# Patient Record
Sex: FEMALE | Race: WHITE | NOT HISPANIC OR LATINO | Employment: FULL TIME | URBAN - NONMETROPOLITAN AREA
[De-identification: names, ages, dates, MRNs, and addresses within clinical notes are randomized per-mention and may not be internally consistent; named-entity substitution may affect disease eponyms.]

---

## 2022-04-22 ENCOUNTER — TRANSCRIBE ORDERS (OUTPATIENT)
Dept: GENERAL RADIOLOGY | Facility: HOSPITAL | Age: 62
End: 2022-04-22

## 2022-04-22 ENCOUNTER — HOSPITAL ENCOUNTER (OUTPATIENT)
Dept: GENERAL RADIOLOGY | Facility: HOSPITAL | Age: 62
Discharge: HOME | End: 2022-04-22
Admitting: FAMILY MEDICINE

## 2022-04-22 DIAGNOSIS — M25.532 BILATERAL WRIST PAIN: ICD-10-CM

## 2022-04-22 DIAGNOSIS — M25.532 BILATERAL WRIST PAIN: Primary | ICD-10-CM

## 2022-04-22 DIAGNOSIS — M25.531 BILATERAL WRIST PAIN: Primary | ICD-10-CM

## 2022-04-22 DIAGNOSIS — M25.531 BILATERAL WRIST PAIN: ICD-10-CM

## 2022-04-22 DIAGNOSIS — Z12.31 SCREENING MAMMOGRAM FOR BREAST CANCER: ICD-10-CM

## 2022-04-22 PROCEDURE — 73110 X-RAY EXAM OF WRIST: CPT

## 2022-08-16 ENCOUNTER — OFFICE VISIT (OUTPATIENT)
Dept: NEUROSURGERY | Facility: CLINIC | Age: 62
End: 2022-08-16

## 2022-08-16 ENCOUNTER — TELEPHONE (OUTPATIENT)
Dept: NEUROSURGERY | Facility: CLINIC | Age: 62
End: 2022-08-16

## 2022-08-16 VITALS
SYSTOLIC BLOOD PRESSURE: 120 MMHG | HEIGHT: 66 IN | TEMPERATURE: 97.8 F | DIASTOLIC BLOOD PRESSURE: 70 MMHG | WEIGHT: 164.2 LBS | BODY MASS INDEX: 26.39 KG/M2

## 2022-08-16 DIAGNOSIS — G89.29 OTHER CHRONIC BACK PAIN: Primary | ICD-10-CM

## 2022-08-16 DIAGNOSIS — M54.50 CHRONIC BILATERAL LOW BACK PAIN WITHOUT SCIATICA: Primary | ICD-10-CM

## 2022-08-16 DIAGNOSIS — M54.9 OTHER CHRONIC BACK PAIN: Primary | ICD-10-CM

## 2022-08-16 DIAGNOSIS — Z98.890 HISTORY OF LUMBAR DISCECTOMY: ICD-10-CM

## 2022-08-16 DIAGNOSIS — G89.29 CHRONIC BILATERAL LOW BACK PAIN WITHOUT SCIATICA: Primary | ICD-10-CM

## 2022-08-16 PROCEDURE — 99204 OFFICE O/P NEW MOD 45 MIN: CPT

## 2022-08-16 NOTE — PROGRESS NOTES
Subjective   Patient: Chayo Greenfield   Age, Date of Birth: 62 y.o., 1960  Sex: female    Primary Care Provider: Caryl Lawson MD    Chief Complaint: Chronic low back pain    History of Present Illness:  Patient is a 62-year-old female with a past surgical history of an L3-L4 and L4-L5 discectomy performed in 2021 in [place].  No instrumentation was documented in the operative report.  Shortly thereafter her surgery, her neurosurgeon was diagnosed with pancreatic cancer, and  shortly thereafter.  Patient never had any follow-up from her procedure, and denies having any follow-up films performed.  Prior to the surgery patient was experiencing low back pain, that have been managed by injections at L3-L4 for a year and a half until that was no longer an efficacious therapy.  Patient also reports having injections at L5-S1, but reports that those were not as efficacious as L3-L4.  Prior to the surgery patient had been experiencing numbness and pain in the left leg, but no weakness.  Patient denies any right-sided symptoms.  She also was having difficulty walking prior to the surgery.  Patient reports that after the surgery her leg symptoms have almost entirely resolved, she does have intermittent shooting pain in her left hip.      Her main concern today is continuing back pain and the pain in her left hip that she has not experienced since before the surgery.  Patient reports that the back pain did not go away after her surgery, but it certainly has worsened since the surgery.  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  Patient reports lots of twisting, bending with weight.  That being said her pain is mostly in the lower back which has been managed by [facility] in [place] with injections, though the efficacy of the injections is not as  efficacious as the patient would like. Patient denies having gone to physical therapy since her procedure.  Patient denies any urinary or bowel incontinence.      Current Outpatient Medications   Medication Sig Dispense Refill   • HYDROCODONE-ACETAMINOPHEN PO Take 10 mg by mouth. 2-3 times as needed       No current facility-administered medications for this visit.       No Known Allergies      Past Medical History:   Diagnosis Date   • Arthritis    • Back ache        Social History     Socioeconomic History   • Marital status:    Tobacco Use   • Smokeless tobacco: Never Used   Substance and Sexual Activity   • Drug use: Never       Family History   Problem Relation Age of Onset   • Hypertension Sister        Review of Systems   Constitutional: Negative for activity change, appetite change, chills, diaphoresis, fatigue, fever and unexpected weight change.   HENT: Negative for congestion, dental problem, drooling, ear discharge, ear pain, facial swelling, hearing loss, mouth sores, nosebleeds, postnasal drip, rhinorrhea, sinus pressure, sinus pain, sneezing, sore throat, tinnitus, trouble swallowing and voice change.    Eyes: Negative for photophobia, pain, discharge, redness, itching and visual disturbance.   Respiratory: Negative for apnea, cough, choking, chest tightness, shortness of breath, wheezing and stridor.    Cardiovascular: Negative for chest pain, palpitations and leg swelling.   Gastrointestinal: Negative for abdominal distention, abdominal pain, anal bleeding, blood in stool, constipation, diarrhea, nausea, rectal pain and vomiting.   Endocrine: Negative for cold intolerance, heat intolerance, polydipsia, polyphagia and polyuria.   Genitourinary: Negative for decreased urine volume, difficulty urinating, dysuria, enuresis, flank pain, frequency, genital sores, hematuria and urgency.   Musculoskeletal: Positive for back pain. Negative for arthralgias, gait problem, joint swelling, myalgias, neck  "pain and neck stiffness.   Skin: Negative for color change, pallor, rash and wound.   Allergic/Immunologic: Negative for environmental allergies, food allergies and immunocompromised state.   Neurological: Positive for numbness. Negative for dizziness, tremors, seizures, syncope, facial asymmetry, speech difficulty, weakness, light-headedness and headaches.   Hematological: Negative for adenopathy. Does not bruise/bleed easily.   Psychiatric/Behavioral: Negative for agitation, behavioral problems, confusion, decreased concentration, dysphoric mood, hallucinations, self-injury, sleep disturbance and suicidal ideas. The patient is not nervous/anxious and is not hyperactive.        Objective   Vitals:    08/16/22 1456   BP: 120/70   BP Location: Right arm   Patient Position: Sitting   Cuff Size: Adult   Temp: 97.8 °F (36.6 °C)   Weight: 74.5 kg (164 lb 3.2 oz)   Height: 167.6 cm (66\")        Physical Exam:    Physical Exam  Constitutional:       Appearance: Normal appearance.   HENT:      Right Ear: External ear normal.      Left Ear: External ear normal.   Eyes:      General: No scleral icterus.        Right eye: No discharge.         Left eye: No discharge.   Pulmonary:      Effort: Pulmonary effort is normal. No respiratory distress.   Musculoskeletal:      Comments: Patient had 5 out of 5 strength with hip flexion, dorsal and plantar flexion bilaterally.   Neurological:      Mental Status: She is alert.      Cranial Nerves: No cranial nerve deficit or facial asymmetry.      Sensory: Sensation is intact. No sensory deficit.      Motor: Motor function is intact. No weakness, tremor, atrophy or abnormal muscle tone.      Coordination: Romberg sign negative.      Gait: Gait and tandem walk normal.      Deep Tendon Reflexes:      Reflex Scores:       Patellar reflexes are 2+ on the right side and 2+ on the left side.       Achilles reflexes are 2+ on the right side and 2+ on the left side.     Comments: Straight leg " raise on left side reproduced a pain from the left outer hip into the lower back, consistent with the pain she was experiencing prior to her surgery.  Patient had intact vibratory and temperature sensation in lower extremities bilaterally.  Patient had no signs of ankle clonus bilaterally.  Patient was able to walk on tiptoes as well as heels free of pain or difficulty.   Psychiatric:         Mood and Affect: Mood normal.         Thought Content: Thought content normal.         Judgment: Judgment normal.         Chayo Greenfield  does not have a smoking history on file. She has never used smokeless tobacco.. I     BMI is >= 25 and <30. (Overweight) The following options were offered after discussion;: exercise counseling/recommendations     STEADI Fall Risk Assessment has not been completed.    Assessment & Plan     Data Review:  (All imaging is independently reviewed unless stated otherwise.)  Patient underwent an MRI of the lumbar spine without contrast on 7/10/2021.  In general disc height is good, and disc do not appear to be desiccated.    Canal is mostly patent with minor disc herniations at L2-L3, L4-L5, and L5-S1.  None of these appear to impinge on the spinal cord.   Patient has minor to moderate left neuroforaminal narrowing at L3-L4.   Patient has minor to moderate neuroforaminal narrowing at L4-L5.  Patient has moderate to severe left-sided neuroforaminal narrowing at L5-S1.    Medical Decision Making:  Given that the patient has had no proper follow-up from her procedure, patient will undergo an x-ray of the lumbar spine with flexion and extension to assess how well she has healed from her prior surgery.  Given her new symptoms since the surgery as well, new MRI of the lumbar spine will be taken to assess her nerve roots.  Patient has not undergone an MRI since her before her and procedure.  She was educated that her back pain may be chronic in nature, and she was given a PT referral to help her better  manage her low back pain.  Patient will follow up with Jason Peralta PA-C in 6 weeks to assess how PT has gone as well as go over her films with her.  If there is nothing surgical found on MRI, patient will likely have to continue her low back pain with pain management like she has been prior.  Patient encouraged to contact us if they have any changes in their condition or any concerns.     Diagnosis Plan   1. Chronic bilateral low back pain without sciatica  Ambulatory Referral to Physical Therapy    MRI Lumbar Spine Without Contrast    XR Spine Lumbar Flex & Ext   2. History of lumbar discectomy  Ambulatory Referral to Physical Therapy    MRI Lumbar Spine Without Contrast    XR Spine Lumbar Flex & Ext    From L3 to L5 performed in November 2021 in [place]       Electronically signed by:    Jason Peralta PA-C on 13:30 EDT 08/17/22

## 2022-09-22 ENCOUNTER — HOSPITAL ENCOUNTER (OUTPATIENT)
Dept: MRI IMAGING | Facility: HOSPITAL | Age: 62
Discharge: HOME | End: 2022-09-22

## 2022-09-22 DIAGNOSIS — G89.29 CHRONIC BILATERAL LOW BACK PAIN WITHOUT SCIATICA: ICD-10-CM

## 2022-09-22 DIAGNOSIS — Z98.890 HISTORY OF LUMBAR DISCECTOMY: ICD-10-CM

## 2022-09-22 DIAGNOSIS — M54.50 CHRONIC BILATERAL LOW BACK PAIN WITHOUT SCIATICA: ICD-10-CM

## 2022-09-22 PROCEDURE — 72148 MRI LUMBAR SPINE W/O DYE: CPT

## 2022-09-28 ENCOUNTER — OFFICE VISIT (OUTPATIENT)
Dept: NEUROSURGERY | Facility: CLINIC | Age: 62
End: 2022-09-28

## 2022-09-28 VITALS
HEIGHT: 66 IN | TEMPERATURE: 96.4 F | SYSTOLIC BLOOD PRESSURE: 118 MMHG | WEIGHT: 163.8 LBS | BODY MASS INDEX: 26.33 KG/M2 | DIASTOLIC BLOOD PRESSURE: 68 MMHG

## 2022-09-28 DIAGNOSIS — Z98.890 HISTORY OF LUMBAR DISCECTOMY: Primary | ICD-10-CM

## 2022-09-28 DIAGNOSIS — G89.29 CHRONIC BILATERAL LOW BACK PAIN WITH LEFT-SIDED SCIATICA: ICD-10-CM

## 2022-09-28 DIAGNOSIS — M54.42 CHRONIC BILATERAL LOW BACK PAIN WITH LEFT-SIDED SCIATICA: ICD-10-CM

## 2022-09-28 DIAGNOSIS — M54.9 OTHER CHRONIC BACK PAIN: ICD-10-CM

## 2022-09-28 DIAGNOSIS — G89.29 OTHER CHRONIC BACK PAIN: ICD-10-CM

## 2022-09-28 PROCEDURE — 99214 OFFICE O/P EST MOD 30 MIN: CPT

## 2022-09-28 RX ORDER — IBUPROFEN 200 MG
600 TABLET ORAL EVERY 6 HOURS PRN
COMMUNITY

## 2022-09-28 NOTE — PROGRESS NOTES
Subjective   Patient: Chayo Greenfield   Age, Date of Birth: 62 y.o., 1960  Sex: female    Primary Care Provider: Caryl Lawson MD    Chief Complaint: Low back pain that radiates into the left leg    History of Present Illness:  Patient is a 62-year-old female presenting for follow-up from her appointment on 2022 with myself, at that appointment she was espousing continuing low back pain from a L3-L4 and L4-L5 discectomy performed in 2021 in [place], her neurosurgeon  shortly thereafter and she was lost to follow-up.  I gave her a PT referral at that appointment, however she was unable to go to PT.  She is also currently in pain management, they recommended another intervention to her that she is unfamiliar with.  Regards to her back pain her symptoms are about the same as they were last time, at this appointment I was better able to elicit that her back pain was improving after surgery, and her relief kind of plateaued.  Her back pain waxes and wanes, with some days it is a 7 or 8 out of 10, and other days she does not hurt until around 11.  The pain starts in her back, and periodically will radiate into her left leg to the top of the foot.  Her pain is about 90% in her back.  She reports bending over makes her leg pain worse, and that if she works hard 1 day she will have worse back pain the next day.  She denies urinary or bowel incontinence.    Current Outpatient Medications   Medication Sig Dispense Refill   • HYDROCODONE-ACETAMINOPHEN PO Take 10 mg by mouth. 2-3 times as needed     • ibuprofen (ADVIL,MOTRIN) 200 MG tablet Take 600 mg by mouth Every 6 (Six) Hours As Needed for Mild Pain.       No current facility-administered medications for this visit.       No Known Allergies      Past Medical History:   Diagnosis Date   • Arthritis    • Back ache        Social History     Socioeconomic History   • Marital status:    Tobacco Use   • Smokeless tobacco: Never Used   Substance and  "Sexual Activity   • Drug use: Never       Family History   Problem Relation Age of Onset   • Hypertension Sister        Review of Systems   Musculoskeletal: Positive for arthralgias and back pain.   Neurological: Positive for numbness.       Objective   Vitals:    09/28/22 1411   BP: 118/68   Temp: 96.4 °F (35.8 °C)   TempSrc: Infrared   Weight: 74.3 kg (163 lb 12.8 oz)   Height: 167.6 cm (66\")        Physical Exam:    Physical Exam  Vitals and nursing note reviewed.   Constitutional:       Appearance: Normal appearance.   HENT:      Head: Normocephalic and atraumatic.      Right Ear: External ear normal.      Left Ear: External ear normal.   Eyes:      General: No scleral icterus.        Right eye: No discharge.         Left eye: No discharge.   Pulmonary:      Effort: Pulmonary effort is normal. No respiratory distress.   Musculoskeletal:      Right lower leg: No edema.      Left lower leg: No edema.      Comments: Patient had 5 out of 5 strength with hip flexion, plantar and dorsiflexion bilaterally.   Neurological:      Mental Status: She is alert.      Cranial Nerves: No cranial nerve deficit or facial asymmetry.      Sensory: Sensation is intact. No sensory deficit.      Motor: Motor function is intact. No weakness, tremor, atrophy, abnormal muscle tone or seizure activity.      Coordination: Romberg sign negative.      Gait: Gait is intact. Gait and tandem walk normal.      Deep Tendon Reflexes:      Reflex Scores:       Patellar reflexes are 2+ on the right side and 2+ on the left side.       Achilles reflexes are 2+ on the right side and 2+ on the left side.     Comments: Straight leg raise on left side reproduced a pain from the left outer hip into the lower back, consistent with the pain she was experiencing prior to her surgery.  Patient had intact vibratory and temperature sensation in lower extremities bilaterally.  Patient had no signs of ankle clonus bilaterally.  Patient was able to walk on tiptoes " as well as heels free of pain or difficulty   Psychiatric:         Mood and Affect: Mood normal.         Behavior: Behavior normal.         Thought Content: Thought content normal.         Judgment: Judgment normal.         Tobacco Use: Unknown   • Smoking Tobacco Use: Never Assessed   • Smokeless Tobacco Use: Never Used       BMI is >= 25 and <30. (Overweight) The following options were offered after discussion;: exercise counseling/recommendations       STEADI Fall Risk Assessment has not been completed.    Assessment & Plan     Data Review:  (All imaging is independently reviewed unless stated otherwise.)  MRI of the lumbar spine taken on 9/22/2022 shows evidence of the L3 L5 laminotomy.  Minor disc herniation at L2-L3, and a slightly larger disc herniation at L4-L5.  He left lateral disc herniation is noted at L3-L4 as well.  The lateral disc herniation results in moderate left neuroforaminal narrowing.      Medical Decision Making:  Case was discussed with Jarvis Glass PA-C, patient could potentially have a postlaminectomy syndrome for which she was given a PT referral.  I went to the  with her to ensure that this referral was made properly, as for potential logistical reasons it was not made at her last appointment.  I will follow-up with her in 6 to 8 weeks to reassess this.  I need to discuss his case with Dr. Rober Jang to assess whether or not she could be a surgical candidate.  Patient encouraged to contact us if they have any changes in their condition or any concerns.     Diagnosis Plan   1. History of lumbar discectomy     2. Other chronic back pain     3. Chronic bilateral low back pain with left-sided sciatica         Electronically signed by:    Jason Peralta PA-C on 15:10 EDT 10/06/22

## 2022-10-19 ENCOUNTER — TREATMENT (OUTPATIENT)
Dept: PHYSICAL THERAPY | Facility: CLINIC | Age: 62
End: 2022-10-19

## 2022-10-19 DIAGNOSIS — Z98.890 HISTORY OF LUMBAR DISCECTOMY: ICD-10-CM

## 2022-10-19 DIAGNOSIS — M54.50 CHRONIC BILATERAL LOW BACK PAIN WITHOUT SCIATICA: Primary | ICD-10-CM

## 2022-10-19 DIAGNOSIS — G89.29 CHRONIC BILATERAL LOW BACK PAIN WITHOUT SCIATICA: Primary | ICD-10-CM

## 2022-10-19 PROCEDURE — 97140 MANUAL THERAPY 1/> REGIONS: CPT | Performed by: PHYSICAL THERAPIST

## 2022-10-19 PROCEDURE — 97530 THERAPEUTIC ACTIVITIES: CPT | Performed by: PHYSICAL THERAPIST

## 2022-10-19 PROCEDURE — 97110 THERAPEUTIC EXERCISES: CPT | Performed by: PHYSICAL THERAPIST

## 2022-10-19 PROCEDURE — 97161 PT EVAL LOW COMPLEX 20 MIN: CPT | Performed by: PHYSICAL THERAPIST

## 2022-10-19 NOTE — PROGRESS NOTES
Physical Therapy Initial Evaluation and Plan of Care      Patient: Chayo Greenfeild   : 1960  Diagnosis/ICD-10 Code:  Chronic bilateral low back pain without sciatica [M54.50, G89.29]  Referring practitioner: Jason Peralta PA-C    Subjective Evaluation    History of Present Illness  Mechanism of injury: Patient reports that she has been having low back pain for around 3 years. She had back surgery in 2021. She had a micro discectomy L3/L4, L4/L5. She states that her pain got a little better following surgery but not a lot. Her surgery was performed in [place]. Her surgeon passed away shortly after her surgery. She has transitioned to a new specialist here. She is currently in pain management for her symptoms and is considering a spinal stimulator. Most of her low back pain is on the L side. She has pain and numbness in her L LE that comes and goes. She states that prolonged positioning is a problem. She states that bending over makes her leg symptoms worse. She states that laying down will help her pain for awhile but she cannot stay there too long without pain starting again.     She follows up with neurosurgeon sometime in November.       Patient Occupation:  in [place] (she works virtually)  Pain  Current pain rating: 3 (with medication )  At best pain rating: 3  At worst pain ratin  Quality: sharp and burning  Relieving factors: rest, change in position, ice and medications (Percocet every 4 hours )  Aggravating factors: ambulation, squatting, lifting, stairs, prolonged positioning, movement, standing, sleeping and repetitive movement  Progression: no change    Social Support  Lives in: one-story house  Lives with: spouse    Treatments  Previous treatment: physical therapy, medication and injection treatment  Current treatment: medication  Patient Goals  Patient goals for therapy: decreased pain, increased motion, increased strength, independence with ADLs/IADLs and return to  sport/leisure activities             Objective        Special Questions      Additional Special Questions  Patient denies red flags.       Postural Observations  Seated posture: fair  Standing posture: good    Additional Postural Observation Details  Patient sits with slightly flexed posture.     Palpation   Left   Hypertonic in the erector spinae and lumbar paraspinals.     Right   Hypertonic in the erector spinae and lumbar paraspinals.     Additional Palpation Details  No tenderness is noted in lumbar musculature.     Neurological Testing     Sensation     Lumbar   Left   Intact: light touch    Right   Intact: light touch    Reflexes   Left   Patellar (L4): normal (2+)  Achilles (S1): normal (2+)    Right   Patellar (L4): normal (2+)  Achilles (S1): normal (2+)    Active Range of Motion     Lumbar   Flexion: 50 degrees   Extension: 8 degrees     Strength/Myotome Testing     Left Hip   Planes of Motion   Flexion: 4-  Abduction: 4-  Adduction: 4-    Right Hip   Planes of Motion   Flexion: 4-  Abduction: 4-  Adduction: 4-    Left Knee   Flexion: 4  Extension: 4    Right Knee   Flexion: 4  Extension: 4    Left Ankle/Foot   Dorsiflexion: 5  Plantar flexion: 5    Right Ankle/Foot   Dorsiflexion: 5  Plantar flexion: 5    Tests       Thoracic   Positive slump.     Lumbar     Left   Negative crossed SLR and passive SLR.     Right   Negative crossed SLR and passive SLR.     Left Knee   Negative peroneal nerve tension.     Right Knee   Negative peroneal nerve tension.      General Comments     Lumbar Comments  Patient ambulates with guarded gait pattern.     She demonstrates no difficulty rising to stand.     HEP established. Patient with no pain or discomfort in low back or R LE with exercises.     She demonstrates difficulty maintaining core contraction with pelvic tilt exercise.     Diagnosis/prognosis reviewed.          Assessment & Plan     Assessment  Impairments: abnormal or restricted ROM, activity intolerance,  impaired physical strength, lacks appropriate home exercise program, pain with function and weight-bearing intolerance  Functional Limitations: carrying objects, lifting, walking, pulling, pushing, uncomfortable because of pain, sitting, standing, stooping and unable to perform repetitive tasks  Assessment details: Patient is a 62 year old female who comes to physical therapy with complaints of low back pain. Signs and symptoms are consistent with chronic low back pain. No neuro signs or symptoms were noted during exam, however, patient does describe symptoms into L LE at times. She demonstrates a postural deficit that is likely contributing to pain. She has hx of lumbar discectomy. The patient currently has pain, decreased ROM, decreased strength (core and postural muscles), and inability to perform many essential functional activities. Pt given HEP to assist with noted deficits. She had no reports of pain in low back or in L LE with exercises. Pt will benefit from skilled PT services to address the above issues.       Goals  Plan Goals: SHORT TERM GOALS:     2 weeks  1. Pt independent with HEP  2. Pt to demonstrate trunk AROM 50-75% of expected norms to allow for improved ability to perform ADL's  3. Pt to demonstrate bilateral hip strength 4/5 in all planes to improved stability of the core/trunk     LONG TERM GOALS:   6 weeks  1. Pt to demonstrate trunk AROM % of expected norms to allow for improved ability to perform functional activities  2. Pt to demonstrate ability to perform full functional squat with good form and without increased pain in the low back   3. Pt to report being able to work full shift or work in the home without increase in pain in the back          Plan  Therapy options: will be seen for skilled therapy services  Planned modality interventions: cryotherapy, dry needling, electrical stimulation/Burmese stimulation, ultrasound and thermotherapy (hydrocollator packs)  Planned therapy  interventions: abdominal trunk stabilization, body mechanics training, fine motor coordination training, flexibility, functional ROM exercises, home exercise program, joint mobilization, therapeutic activities, stretching, strengthening, spinal/joint mobilization, soft tissue mobilization, neuromuscular re-education and manual therapy  Frequency: 2x week  Duration in weeks: 6  Treatment plan discussed with: patient        Manual Therapy:    13     mins  45847;  Therapeutic Exercise:    10     mins  44008;     Neuromuscular Coco:        mins  93852;    Therapeutic Activity:     15     mins  37917;     Gait Training:           mins  00601;     Ultrasound:          mins  18938;    Electrical Stimulation:         mins  60619 ( );  Dry Needling          mins self-pay    Timed Treatment:   38   mins   Total Treatment:     55   mins    PT SIGNATURE: Suzanna Browning PT   [place] License: [id number]  DATE TREATMENT INITIATED: 10/19/2022    Initial Certification  Certification Period: 1/16/2023  I certify that the therapy services are furnished while this patient is under my care.  The services outlined above are required by this patient, and will be reviewed every 90 days.     PHYSICIAN: Jason Peralta PA-C      DATE:     Please sign and return via fax to [phone].. Thank you, [facility].

## 2022-10-26 ENCOUNTER — TREATMENT (OUTPATIENT)
Dept: PHYSICAL THERAPY | Facility: CLINIC | Age: 62
End: 2022-10-26

## 2022-10-26 DIAGNOSIS — Z98.890 HISTORY OF LUMBAR DISCECTOMY: ICD-10-CM

## 2022-10-26 DIAGNOSIS — M54.50 CHRONIC BILATERAL LOW BACK PAIN WITHOUT SCIATICA: Primary | ICD-10-CM

## 2022-10-26 DIAGNOSIS — G89.29 CHRONIC BILATERAL LOW BACK PAIN WITHOUT SCIATICA: Primary | ICD-10-CM

## 2022-10-26 PROCEDURE — 97110 THERAPEUTIC EXERCISES: CPT | Performed by: PHYSICAL THERAPIST

## 2022-10-26 PROCEDURE — 97140 MANUAL THERAPY 1/> REGIONS: CPT | Performed by: PHYSICAL THERAPIST

## 2022-10-26 PROCEDURE — 97530 THERAPEUTIC ACTIVITIES: CPT | Performed by: PHYSICAL THERAPIST

## 2022-10-31 ENCOUNTER — TREATMENT (OUTPATIENT)
Dept: PHYSICAL THERAPY | Facility: CLINIC | Age: 62
End: 2022-10-31

## 2022-10-31 DIAGNOSIS — Z98.890 HISTORY OF LUMBAR DISCECTOMY: ICD-10-CM

## 2022-10-31 DIAGNOSIS — G89.29 CHRONIC BILATERAL LOW BACK PAIN WITHOUT SCIATICA: Primary | ICD-10-CM

## 2022-10-31 DIAGNOSIS — M54.50 CHRONIC BILATERAL LOW BACK PAIN WITHOUT SCIATICA: Primary | ICD-10-CM

## 2022-10-31 PROCEDURE — 97140 MANUAL THERAPY 1/> REGIONS: CPT | Performed by: PHYSICAL THERAPIST

## 2022-10-31 PROCEDURE — 97110 THERAPEUTIC EXERCISES: CPT | Performed by: PHYSICAL THERAPIST

## 2022-10-31 PROCEDURE — 97530 THERAPEUTIC ACTIVITIES: CPT | Performed by: PHYSICAL THERAPIST

## 2022-11-02 ENCOUNTER — TREATMENT (OUTPATIENT)
Dept: PHYSICAL THERAPY | Facility: CLINIC | Age: 62
End: 2022-11-02

## 2022-11-02 DIAGNOSIS — M54.50 CHRONIC BILATERAL LOW BACK PAIN WITHOUT SCIATICA: Primary | ICD-10-CM

## 2022-11-02 DIAGNOSIS — Z98.890 HISTORY OF LUMBAR DISCECTOMY: ICD-10-CM

## 2022-11-02 DIAGNOSIS — G89.29 CHRONIC BILATERAL LOW BACK PAIN WITHOUT SCIATICA: Primary | ICD-10-CM

## 2022-11-02 PROCEDURE — 97140 MANUAL THERAPY 1/> REGIONS: CPT | Performed by: PHYSICAL THERAPIST

## 2022-11-02 PROCEDURE — 97110 THERAPEUTIC EXERCISES: CPT | Performed by: PHYSICAL THERAPIST

## 2022-11-02 PROCEDURE — 97530 THERAPEUTIC ACTIVITIES: CPT | Performed by: PHYSICAL THERAPIST

## 2022-11-02 NOTE — PROGRESS NOTES
Physical Therapy Daily Note    Patient Information  Chayo Greenfield  1960      Visit # : 4    Chayo Greenfield reports 0/10 pain today at rest.  Patient reports that she was sore yesterday. She is unsure if PT caused this or working at home. She states that this is better today. She has been busy over the last few days and has been pretty active.         Objective Pt presents to PT today with no distress noted.     TG squats added today with no issues noted.     No pain present throughout session.     Patient ambulating well with no deficits noted.     No skin irritation noted following moist heat.     See Exercise, Manual, and Modality Logs for complete treatment.     Assessment/Plan  Patient tolerated session well with no increased pain with exercises. No tenderness was noted in lumbar region today. She is ambulating and performing positional changes well. She tolerated manual therapy well with no discomfort reported. She was encouraged to continue HEP at home and utilize ice/heat as needed. She was encouraged to avoid activities that irritate low back.       Progress per Plan of Care  PT will continue to monitor patients signs and symptoms and progress patient as tolerated.     Visit Diagnoses:    ICD-10-CM ICD-9-CM   1. Chronic bilateral low back pain without sciatica  M54.50 724.2    G89.29 338.29   2. History of lumbar discectomy  Z98.890 V15.29            Manual Therapy:    16     mins  21350;  Therapeutic Exercise:    18     mins  67651;     Neuromuscular Coco:        mins  79768;    Therapeutic Activity:     10     mins  33725;     Gait Training:           mins  64941;     Ultrasound:          mins  51575;    Electrical Stimulation:         mins  07264 ( );  Dry Needling          mins self-pay    Timed Treatment:   44   mins   Total Treatment:     55   mins          Suzanna Browning PT  Physical Therapist  [place] License: [id number]

## 2022-11-07 ENCOUNTER — TREATMENT (OUTPATIENT)
Dept: PHYSICAL THERAPY | Facility: CLINIC | Age: 62
End: 2022-11-07

## 2022-11-07 DIAGNOSIS — Z98.890 HISTORY OF LUMBAR DISCECTOMY: ICD-10-CM

## 2022-11-07 DIAGNOSIS — G89.29 CHRONIC BILATERAL LOW BACK PAIN WITHOUT SCIATICA: Primary | ICD-10-CM

## 2022-11-07 DIAGNOSIS — M54.50 CHRONIC BILATERAL LOW BACK PAIN WITHOUT SCIATICA: Primary | ICD-10-CM

## 2022-11-07 PROCEDURE — 97530 THERAPEUTIC ACTIVITIES: CPT | Performed by: PHYSICAL THERAPIST

## 2022-11-07 PROCEDURE — 97110 THERAPEUTIC EXERCISES: CPT | Performed by: PHYSICAL THERAPIST

## 2022-11-07 PROCEDURE — 97140 MANUAL THERAPY 1/> REGIONS: CPT | Performed by: PHYSICAL THERAPIST

## 2022-11-07 NOTE — PROGRESS NOTES
Physical Therapy Daily Note    Patient Information  Chayo Greenfield  1960      Visit # : 5    Chayo Greenfield reports 2/10 pain today at rest.  Patient reports that over the weekend she cut back a lot of her plants. She states that bending over a lot must have set her symptoms off. She states that she woke up this morning in a lot of pain 7-8/10 but it has lessened some now.         Objective Pt presents to PT today with no distress noted.     Mild tenderness is noted in L lumbar paraspinals.     Patient with increased pain with central PA to L5 today.     Patient with mild irritation of symptoms with SL hip abduction.     No skin irritation noted following moist heat.       See Exercise, Manual, and Modality Logs for complete treatment.     Assessment/Plan  Patient tolerated session well. She had reports of discomfort with SL hip abduction but symptoms improved following exercise. She tolerated manual therapy well. She was encouraged to continue HEP at home use ice/heat as needed. Patient was educated on modifying activities and taking rest frequently to avoid irritation of symptoms.       Progress per Plan of Care  PT will continue to monitor patients signs and symptoms and progress patient as tolerated.     Visit Diagnoses:    ICD-10-CM ICD-9-CM   1. Chronic bilateral low back pain without sciatica  M54.50 724.2    G89.29 338.29   2. History of lumbar discectomy  Z98.890 V15.29            Manual Therapy:    14     mins  82732;  Therapeutic Exercise:    17     mins  52001;     Neuromuscular Coco:        mins  54476;    Therapeutic Activity:     10     mins  10434;     Gait Training:           mins  45036;     Ultrasound:          mins  07599;    Electrical Stimulation:         mins  39125 ( );  Dry Needling          mins self-pay    Timed Treatment:   41   mins   Total Treatment:     55   mins          Suzanna Browning PT  Physical Therapist  [place] License: [id number]

## 2022-11-09 ENCOUNTER — TREATMENT (OUTPATIENT)
Dept: PHYSICAL THERAPY | Facility: CLINIC | Age: 62
End: 2022-11-09

## 2022-11-09 DIAGNOSIS — M54.50 CHRONIC BILATERAL LOW BACK PAIN WITHOUT SCIATICA: Primary | ICD-10-CM

## 2022-11-09 DIAGNOSIS — G89.29 CHRONIC BILATERAL LOW BACK PAIN WITHOUT SCIATICA: Primary | ICD-10-CM

## 2022-11-09 DIAGNOSIS — Z98.890 HISTORY OF LUMBAR DISCECTOMY: ICD-10-CM

## 2022-11-09 PROCEDURE — 97530 THERAPEUTIC ACTIVITIES: CPT | Performed by: PHYSICAL THERAPIST

## 2022-11-09 PROCEDURE — 97110 THERAPEUTIC EXERCISES: CPT | Performed by: PHYSICAL THERAPIST

## 2022-11-09 PROCEDURE — 97140 MANUAL THERAPY 1/> REGIONS: CPT | Performed by: PHYSICAL THERAPIST

## 2022-11-09 NOTE — PROGRESS NOTES
Physical Therapy Daily Note    Patient Information  Chayo Greenfield  1960      Visit # : 6    Chayo Greenfield reports 6/10 pain today at rest.  Patient reports that her back is doing a little better today. She states that she is still having pain. She has some discomfort in the front part of her thigh.         Objective Pt presents to PT today with no distress noted.     Patient was lightly progressed today with no issues noted.     Patient with mild fatigue with exercises.     No irritation of symptoms with exercises.     Patient with reports of reduced low back pain and LE pain with prone press up.     No skin irritation noted following moist heat.       See Exercise, Manual, and Modality Logs for complete treatment.     Assessment/Plan  Patient tolerated session well with no increased pain with exercises. Mild tenderness was noted in B lumbar paraspinals. She demonstrates ability to ambulate without deficit and perform positional changes well. She tolerated manual therapy well. Very mild discomfort was reported with lumbar PA's. She had reports of slightly reduced symptoms at completion of session. She was encouraged to continue HEP and utilize ice/heat as needed at home.       Progress per Plan of Care  PT will continue to monitor patients signs and symptoms and progress patient as tolerated.     Visit Diagnoses:    ICD-10-CM ICD-9-CM   1. Chronic bilateral low back pain without sciatica  M54.50 724.2    G89.29 338.29   2. History of lumbar discectomy  Z98.890 V15.29            Manual Therapy:    15     mins  86306;  Therapeutic Exercise:    16     mins  99982;     Neuromuscular Coco:        mins  26375;    Therapeutic Activity:     11     mins  29596;     Gait Training:           mins  57535;     Ultrasound:          mins  25050;    Electrical Stimulation:         mins  18944 ( );  Dry Needling          mins self-pay    Timed Treatment:   42   mins   Total Treatment:     52   mins          Suzanna  Nallely PT  Physical Therapist  [place] License: [id number]

## 2022-11-14 ENCOUNTER — TREATMENT (OUTPATIENT)
Dept: PHYSICAL THERAPY | Facility: CLINIC | Age: 62
End: 2022-11-14

## 2022-11-14 DIAGNOSIS — G89.29 CHRONIC BILATERAL LOW BACK PAIN WITHOUT SCIATICA: Primary | ICD-10-CM

## 2022-11-14 DIAGNOSIS — M54.50 CHRONIC BILATERAL LOW BACK PAIN WITHOUT SCIATICA: Primary | ICD-10-CM

## 2022-11-14 DIAGNOSIS — Z98.890 HISTORY OF LUMBAR DISCECTOMY: ICD-10-CM

## 2022-11-14 PROCEDURE — 97110 THERAPEUTIC EXERCISES: CPT | Performed by: PHYSICAL THERAPIST

## 2022-11-14 PROCEDURE — 97530 THERAPEUTIC ACTIVITIES: CPT | Performed by: PHYSICAL THERAPIST

## 2022-11-14 PROCEDURE — 97140 MANUAL THERAPY 1/> REGIONS: CPT | Performed by: PHYSICAL THERAPIST

## 2022-11-14 NOTE — PROGRESS NOTES
Physical Therapy Daily Note    Patient Information  Chayo Greenfield  1960      Visit # : 7    Chayo Greenfield reports 4-5/10 pain today at rest.  Patient reports that her back is doing okay today. She states that she had a lot of pain in her back on Saturday following painting a door. She states that she tries to be cautious but sometimes she is unsure what is going to flare her back up.         Objective Pt presents to PT today with no distress noted.     Very mild tenderness is noted in L lumbar paraspinals.     No irritation of symptoms with exercises.     No skin irritation following moist heat.      See Exercise, Manual, and Modality Logs for complete treatment.     Assessment/Plan  Patient tolerated session well with no increased pain with exercises. Tenderness was noted in lumbar region today. She is ambulating well and performing positional changes without issues. She tolerated manual therapy well with no discomfort reported. She was encouraged to continue HEP and utilize ice/heat as needed.       Progress per Plan of Care  PT will continue to monitor patients signs and symptoms and progress patient as tolerated.     Visit Diagnoses:    ICD-10-CM ICD-9-CM   1. Chronic bilateral low back pain without sciatica  M54.50 724.2    G89.29 338.29   2. History of lumbar discectomy  Z98.890 V15.29            Manual Therapy:    14     mins  80537;  Therapeutic Exercise:    16     mins  92803;     Neuromuscular Coco:        mins  18024;    Therapeutic Activity:     10     mins  77367;     Gait Training:           mins  49447;     Ultrasound:          mins  91775;    Electrical Stimulation:         mins  51253 ( );  Dry Needling          mins self-pay    Timed Treatment:   40   mins   Total Treatment:     50   mins          Suzanna Browning PT  Physical Therapist  [place] License: [id number]

## 2022-11-21 ENCOUNTER — TREATMENT (OUTPATIENT)
Dept: PHYSICAL THERAPY | Facility: CLINIC | Age: 62
End: 2022-11-21

## 2022-11-21 DIAGNOSIS — M54.50 CHRONIC BILATERAL LOW BACK PAIN WITHOUT SCIATICA: Primary | ICD-10-CM

## 2022-11-21 DIAGNOSIS — G89.29 CHRONIC BILATERAL LOW BACK PAIN WITHOUT SCIATICA: Primary | ICD-10-CM

## 2022-11-21 DIAGNOSIS — Z98.890 HISTORY OF LUMBAR DISCECTOMY: ICD-10-CM

## 2022-11-21 PROCEDURE — 97110 THERAPEUTIC EXERCISES: CPT | Performed by: PHYSICAL THERAPIST

## 2022-11-21 PROCEDURE — 97530 THERAPEUTIC ACTIVITIES: CPT | Performed by: PHYSICAL THERAPIST

## 2022-11-21 PROCEDURE — 97140 MANUAL THERAPY 1/> REGIONS: CPT | Performed by: PHYSICAL THERAPIST

## 2022-11-21 NOTE — PROGRESS NOTES
Physical Therapy Daily Note    Patient Information  Chayo Greenfield  1960      Visit # : 8    Chayo Greenfield reports 6/10 pain today at rest.  Patient reports that her symptoms have switched sides. She now has pain in her R side of her back and R leg. She noticed this happened yesterday. Over the weekend she went to the [employer] football game and sat in the bleachers for several hours. She states she had to walk a lot this weekend and she was on her foot cooking a lot. She feels this is what flared her up.         Objective Pt presents to PT today with no distress noted.     Patient with moderate tenderness in R lumbar paraspinals.     Patient with increased pain with unilateral PA to R transverse process of L4.     Patient with reports of reduced pain at completion of session.     No skin irritation following moist heat.       See Exercise, Manual, and Modality Logs for complete treatment.     Assessment/Plan  Patient tolerated session well with no increased pain with exercises. Tenderness is noted in lumbar region today. She demonstrates grimacing with positional changes. She tolerated manual therapy well. She had slightly reduced pain at completion of session. She was encouraged to continue HEP and utilize ice as needed at home.       Progress per Plan of Care  PT will continue to monitor patients signs and symptoms and progress patient as tolerated.     Visit Diagnoses:    ICD-10-CM ICD-9-CM   1. Chronic bilateral low back pain without sciatica  M54.50 724.2    G89.29 338.29   2. History of lumbar discectomy  Z98.890 V15.29            Manual Therapy:    15     mins  14480;  Therapeutic Exercise:    15     mins  25968;     Neuromuscular Coco:        mins  83348;    Therapeutic Activity:     10     mins  53922;     Gait Training:           mins  40246;     Ultrasound:          mins  43298;    Electrical Stimulation:         mins  56824 ( );  Dry Needling          mins self-pay    Timed Treatment:   40   mins    Total Treatment:     50   mins          Suzanna Browning, PT  Physical Therapist  [place] License: [id number]

## 2022-11-23 ENCOUNTER — OFFICE VISIT (OUTPATIENT)
Dept: NEUROSURGERY | Facility: CLINIC | Age: 62
End: 2022-11-23

## 2022-11-23 ENCOUNTER — TREATMENT (OUTPATIENT)
Dept: PHYSICAL THERAPY | Facility: CLINIC | Age: 62
End: 2022-11-23

## 2022-11-23 VITALS
SYSTOLIC BLOOD PRESSURE: 118 MMHG | HEIGHT: 66 IN | DIASTOLIC BLOOD PRESSURE: 72 MMHG | BODY MASS INDEX: 26.29 KG/M2 | WEIGHT: 163.6 LBS | TEMPERATURE: 97.1 F

## 2022-11-23 DIAGNOSIS — Z98.890 HISTORY OF LUMBAR DISCECTOMY: ICD-10-CM

## 2022-11-23 DIAGNOSIS — M54.41 ACUTE RIGHT-SIDED BACK PAIN WITH SCIATICA: ICD-10-CM

## 2022-11-23 DIAGNOSIS — G89.29 CHRONIC BILATERAL LOW BACK PAIN WITHOUT SCIATICA: Primary | ICD-10-CM

## 2022-11-23 DIAGNOSIS — M54.42 CHRONIC BILATERAL LOW BACK PAIN WITH LEFT-SIDED SCIATICA: Primary | ICD-10-CM

## 2022-11-23 DIAGNOSIS — M54.50 CHRONIC BILATERAL LOW BACK PAIN WITHOUT SCIATICA: Primary | ICD-10-CM

## 2022-11-23 DIAGNOSIS — G89.29 CHRONIC BILATERAL LOW BACK PAIN WITH LEFT-SIDED SCIATICA: Primary | ICD-10-CM

## 2022-11-23 PROCEDURE — 97140 MANUAL THERAPY 1/> REGIONS: CPT | Performed by: PHYSICAL THERAPIST

## 2022-11-23 PROCEDURE — 97110 THERAPEUTIC EXERCISES: CPT | Performed by: PHYSICAL THERAPIST

## 2022-11-23 PROCEDURE — 99213 OFFICE O/P EST LOW 20 MIN: CPT

## 2022-11-23 PROCEDURE — 97530 THERAPEUTIC ACTIVITIES: CPT | Performed by: PHYSICAL THERAPIST

## 2022-11-23 RX ORDER — OXYCODONE AND ACETAMINOPHEN 7.5; 325 MG/1; MG/1
TABLET ORAL
COMMUNITY

## 2022-11-23 RX ORDER — METHYLPREDNISOLONE 4 MG/1
TABLET ORAL
Qty: 21 TABLET | Refills: 0 | Status: SHIPPED | OUTPATIENT
Start: 2022-11-23

## 2022-11-23 NOTE — PROGRESS NOTES
Subjective   Patient: Chayo Greenfield   Age, Date of Birth: 62 y.o., 1960  Sex: female    Primary Care Provider: Caryl Lawson MD    Chief Complaint: Low back pain radiating into left leg and top of foot    History of Present Illness:  Patient is a 62-year-old female with past surgical history of an L3-L4, L4-L5 discectomy performed in 2021 in [place], with a neurosurgeon who  shortly thereafter and she was lost to follow-up.  She has been managed by [facility] in [place] who is working towards a spinal cord stimulator placement.  She reports her pain slightly improved postoperatively, but then stagnated and she has been around the same since the stagnation.  She reports her pain is 90% in her back, and movement makes it worse.  She went to physical therapy which provided some relief.  She denies urinary or bowel incontinence, saddle anesthesia.    Current Outpatient Medications   Medication Sig Dispense Refill   • ibuprofen (ADVIL,MOTRIN) 200 MG tablet Take 600 mg by mouth Every 6 (Six) Hours As Needed for Mild Pain.     • oxyCODONE-acetaminophen (PERCOCET) 7.5-325 MG per tablet oxycodone-acetaminophen 7.5 mg-325 mg tablet   take 2-3 tabs po qd prn for pain for 30 days     • methylPREDNISolone (MEDROL) 4 MG dose pack Take as directed on package instructions. 21 tablet 0     No current facility-administered medications for this visit.       No Known Allergies    Past Medical History:   Diagnosis Date   • Arthritis    • Back ache        Social History     Socioeconomic History   • Marital status:    Tobacco Use   • Smoking status: Never   • Smokeless tobacco: Never   Substance and Sexual Activity   • Alcohol use: Never     Comment: occ   • Drug use: Never       Family History   Problem Relation Age of Onset   • Hypertension Sister        Review of Systems   Constitutional: Negative for activity change, appetite change, chills, diaphoresis, fatigue, fever and unexpected weight change.    HENT: Negative for congestion, dental problem, drooling, ear discharge, ear pain, facial swelling, hearing loss, mouth sores, nosebleeds, postnasal drip, rhinorrhea, sinus pressure, sinus pain, sneezing, sore throat, tinnitus, trouble swallowing and voice change.    Eyes: Negative for photophobia, pain, discharge, redness, itching and visual disturbance.   Respiratory: Negative for apnea, cough, choking, chest tightness, shortness of breath, wheezing and stridor.    Cardiovascular: Negative for chest pain, palpitations and leg swelling.   Gastrointestinal: Negative for abdominal distention, abdominal pain, anal bleeding, blood in stool, constipation, diarrhea, nausea, rectal pain and vomiting.   Endocrine: Negative for cold intolerance, heat intolerance, polydipsia, polyphagia and polyuria.   Genitourinary: Negative for decreased urine volume, difficulty urinating, dyspareunia, dysuria, enuresis, flank pain, frequency, genital sores, hematuria, menstrual problem, pelvic pain, urgency, vaginal bleeding, vaginal discharge and vaginal pain.   Musculoskeletal: Positive for back pain. Negative for arthralgias, gait problem, joint swelling, myalgias, neck pain and neck stiffness.   Skin: Negative for color change, pallor, rash and wound.   Allergic/Immunologic: Negative for environmental allergies, food allergies and immunocompromised state.   Neurological: Positive for numbness. Negative for dizziness, tremors, seizures, syncope, facial asymmetry, speech difficulty, weakness, light-headedness and headaches.   Hematological: Negative for adenopathy. Does not bruise/bleed easily.   Psychiatric/Behavioral: Negative for agitation, behavioral problems, confusion, decreased concentration, dysphoric mood, hallucinations, self-injury, sleep disturbance and suicidal ideas. The patient is not nervous/anxious and is not hyperactive.        Objective   Vitals:    11/23/22 1037   BP: 118/72   BP Location: Left arm   Patient  "Position: Sitting   Cuff Size: Adult   Temp: 97.1 °F (36.2 °C)   TempSrc: Infrared   Weight: 74.2 kg (163 lb 9.6 oz)   Height: 167.6 cm (66\")        Physical Exam:    Physical Exam  Vitals and nursing note reviewed.   Constitutional:       Appearance: Normal appearance.   HENT:      Head: Normocephalic and atraumatic.   Musculoskeletal:      Right lower leg: No edema.      Left lower leg: No edema.      Comments: Patient had 5 out of 5 strength with hip flexion, plantar and dorsiflexion bilaterally.     Neurological:      Mental Status: She is alert.      Cranial Nerves: No cranial nerve deficit or facial asymmetry.      Sensory: Sensation is intact. No sensory deficit.      Motor: Motor function is intact. No weakness, tremor, atrophy, abnormal muscle tone or seizure activity.      Coordination: Romberg sign negative.      Gait: Gait is intact. Gait and tandem walk normal.      Deep Tendon Reflexes:      Reflex Scores:       Patellar reflexes are 2+ on the right side and 2+ on the left side.       Achilles reflexes are 2+ on the right side and 2+ on the left side.     Comments: Straight leg raise on left side reproduced a pain from the left outer hip into the lower back, consistent with the pain she was experiencing prior to surgery.  Straight leg raise on right was negative.  Intact vibratory and temperature sensation bilaterally.  No signs of ankle clonus bilaterally.  Was able to ambulate on heels and tiptoes free of pain.   Psychiatric:         Mood and Affect: Mood normal.         Behavior: Behavior normal.         Thought Content: Thought content normal.         Judgment: Judgment normal.         Tobacco Use: Low Risk    • Smoking Tobacco Use: Never   • Smokeless Tobacco Use: Never   • Passive Exposure: Not on file       BMI is >= 25 and <30. (Overweight) The following options were offered after discussion;: exercise counseling/recommendations      SHERI Fall Risk Assessment has not been " completed.    Assessment & Plan     Data Review:  (All imaging is independently reviewed unless stated otherwise.)  No new data reviewed this time.      Medical Decision Making:  Case was reviewed with Dr. Rober Jang and we feel she is not a surgical candidate and should pursue Spinal Cord Stimulator with [facility].  She was educated on the signs and symptoms that would warrant to return, at this point time she is to follow-up as needed.  She also was educated that if she were to have her spinal cord stimulator placed with our practice, Dr. Magaña is only surgeon who does that.  And she would need MRI of the thoracic spine as well as the name of the brand of the spinal cord stimulator, and any placement tabs from the pain management specialist.  It has been a pleasure caring for this patient and we are here for her if she needs us in the future.  Patient encouraged to contact us if she has any changes in her condition or any concerns.     Diagnosis Plan   1. Chronic bilateral low back pain with left-sided sciatica        2. History of lumbar discectomy        3. Acute right-sided back pain with sciatica             Electronically signed and dated:    Jason Peralta PA-C on 15:11 EST 11/23/22

## 2022-11-23 NOTE — PROGRESS NOTES
Physical Therapy Daily Note    Patient Information  Chayo Greenfield  1960      Visit # : 9    Chayo Greenfield reports 5-6/10 pain today at rest.  Patient states that her low back pain was a little better for a few hours following PT but then her pain returned. She states that she has been doing more around her home because she has fall break this week. Her doctor wrote a prescription for a steroid pack that she plans to start later today,.         Objective Pt presents to PT today with no distress noted.     No tenderness noted in low back.     No discomfort with manual therapy today.     No skin irritation following moist heat.     No irritation of symptoms with exercises.       See Exercise, Manual, and Modality Logs for complete treatment.     Assessment/Plan  Patient tolerated session well with no increased pain with exercises. No tenderness was noted in low back. She is ambulating and performing positional changes well. She tolerated manual therapy well with no discomfort reported. She had reports of reduced pain at completion of session 3-4/10. She was encouraged to continue HEP and utilize ice as needed at home.        Progress per Plan of Care  PT will continue to monitor patients signs and symptoms and progress patient as tolerated.     Visit Diagnoses:    ICD-10-CM ICD-9-CM   1. Chronic bilateral low back pain without sciatica  M54.50 724.2    G89.29 338.29   2. History of lumbar discectomy  Z98.890 V15.29            Manual Therapy:    17     mins  99442;  Therapeutic Exercise:    15     mins  05957;     Neuromuscular Coco:        mins  73273;    Therapeutic Activity:     10     mins  13055;     Gait Training:           mins  48218;     Ultrasound:          mins  70970;    Electrical Stimulation:         mins  82805 ( );  Dry Needling          mins self-pay    Timed Treatment:   42   mins   Total Treatment:     54   mins          Suzanna Browning, PT  Physical Therapist  [place] License: [id number]

## 2022-11-30 ENCOUNTER — TREATMENT (OUTPATIENT)
Dept: PHYSICAL THERAPY | Facility: CLINIC | Age: 62
End: 2022-11-30

## 2022-11-30 DIAGNOSIS — Z98.890 HISTORY OF LUMBAR DISCECTOMY: ICD-10-CM

## 2022-11-30 DIAGNOSIS — M54.50 CHRONIC BILATERAL LOW BACK PAIN WITHOUT SCIATICA: Primary | ICD-10-CM

## 2022-11-30 DIAGNOSIS — G89.29 CHRONIC BILATERAL LOW BACK PAIN WITHOUT SCIATICA: Primary | ICD-10-CM

## 2022-11-30 PROCEDURE — 97110 THERAPEUTIC EXERCISES: CPT | Performed by: PHYSICAL THERAPIST

## 2022-11-30 PROCEDURE — 97140 MANUAL THERAPY 1/> REGIONS: CPT | Performed by: PHYSICAL THERAPIST

## 2022-11-30 PROCEDURE — 97530 THERAPEUTIC ACTIVITIES: CPT | Performed by: PHYSICAL THERAPIST

## 2022-11-30 NOTE — PROGRESS NOTES
Physical Therapy Daily Note    Patient Information  Chayo Greenfield  1960      Visit # : 10    Chayo Greenfield reports 3/10 pain today at rest.  Patient reports that her back is doing better. She states after taking the steroid for a few days it has settled back down some. She states that she was able to cook for thanksgiving but she did sit and rest after each hour of standing.         Objective Pt presents to PT today with no distress noted.     Very mild tenderness is noted in B lumbar paraspinals.     Patient with reports of reduced numbness in L LE with SKTC exercise.     No irritation of symptoms with exercises.     No skin irritation noted following moist heat.       See Exercise, Manual, and Modality Logs for complete treatment.     Assessment/Plan  Patient tolerated session well with no increased pain with exercises. Very mild tenderness was noted in low back. She tolerated manual therapy well with no increased pain. She had reports of slight reduced stiffness at completion of session. She was encouraged to continue HEP and utilize ice/heat as needed at home. Patient was educated to avoid activities that irritate low back. She was also educated to avoid prolonged positioning.       Progress per Plan of Care  PT will continue to monitor patients signs and symptoms and progress patient as tolerated.     Visit Diagnoses:    ICD-10-CM ICD-9-CM   1. Chronic bilateral low back pain without sciatica  M54.50 724.2    G89.29 338.29   2. History of lumbar discectomy  Z98.890 V15.29            Manual Therapy:    16     mins  97999;  Therapeutic Exercise:    15     mins  39117;     Neuromuscular Coco:        mins  09457;    Therapeutic Activity:     11     mins  58369;     Gait Training:           mins  44888;     Ultrasound:          mins  09537;    Electrical Stimulation:         mins  08284 ( );  Dry Needling          mins self-pay    Timed Treatment:   42   mins   Total Treatment:     55    gaby Browning, PT  Physical Therapist  [place] License: [id number]